# Patient Record
Sex: FEMALE | Race: WHITE | NOT HISPANIC OR LATINO | Employment: FULL TIME | URBAN - NONMETROPOLITAN AREA
[De-identification: names, ages, dates, MRNs, and addresses within clinical notes are randomized per-mention and may not be internally consistent; named-entity substitution may affect disease eponyms.]

---

## 2019-07-24 ENCOUNTER — HOSPITAL ENCOUNTER (OUTPATIENT)
Dept: NUCLEAR MEDICINE | Facility: HOSPITAL | Age: 49
Discharge: HOME | End: 2019-07-24

## 2019-07-24 DIAGNOSIS — R10.11 ABDOMINAL PAIN, RIGHT UPPER QUADRANT: ICD-10-CM

## 2019-07-24 PROCEDURE — 0 TECHNETIUM TC 99M MEBROFENIN KIT: Performed by: FAMILY MEDICINE

## 2019-07-24 PROCEDURE — 78226 HEPATOBILIARY SYSTEM IMAGING: CPT

## 2019-07-24 PROCEDURE — A9537 TC99M MEBROFENIN: HCPCS | Performed by: FAMILY MEDICINE

## 2019-07-24 RX ORDER — KIT FOR THE PREPARATION OF TECHNETIUM TC 99M MEBROFENIN 45 MG/10ML
1 INJECTION, POWDER, LYOPHILIZED, FOR SOLUTION INTRAVENOUS
Status: COMPLETED | OUTPATIENT
Start: 2019-07-24 | End: 2019-07-24

## 2019-07-24 RX ADMIN — MEBROFENIN 1 DOSE: 45 INJECTION, POWDER, LYOPHILIZED, FOR SOLUTION INTRAVENOUS at 07:05

## 2020-06-30 ENCOUNTER — TRANSCRIBE ORDERS (OUTPATIENT)
Dept: PHYSICAL THERAPY | Facility: HOSPITAL | Age: 50
End: 2020-06-30

## 2020-06-30 DIAGNOSIS — M77.12 LATERAL EPICONDYLITIS OF LEFT ELBOW: Primary | ICD-10-CM

## 2020-07-07 ENCOUNTER — HOSPITAL ENCOUNTER (OUTPATIENT)
Dept: PHYSICAL THERAPY | Facility: HOSPITAL | Age: 50
Setting detail: THERAPIES SERIES
Discharge: HOME | End: 2020-07-07

## 2020-07-07 DIAGNOSIS — M77.12 LEFT LATERAL EPICONDYLITIS: Primary | ICD-10-CM

## 2020-07-07 PROCEDURE — 97161 PT EVAL LOW COMPLEX 20 MIN: CPT | Performed by: PHYSICAL THERAPIST

## 2020-07-07 PROCEDURE — 97110 THERAPEUTIC EXERCISES: CPT | Performed by: PHYSICAL THERAPIST

## 2020-07-07 NOTE — THERAPY EVALUATION
Outpatient Physical Therapy Ortho Initial Evaluation  [facility]     Patient Name: Hailee Ball  : 1970  MRN: [id number]  Today's Date: 2020      Visit Date: 2020  Attendance:  (20v/year)  Subjective Improvement: NA  Next MD Appt: TBD  Recert Date: 20    Therapy Diagnosis: left lateral epicondylitis    Allergies       PenicillinsItching   Olayinka as Reviewed Reviewed by You at 3:44 PM CDT         There is no problem list on file for this patient.       History reviewed. No pertinent past medical history.     History reviewed. No pertinent surgical history.    Visit Dx:     ICD-10-CM ICD-9-CM   1. Left lateral epicondylitis M77.12 726.32         Patient History     Row Name 20 1500             History    Chief Complaint  Pain  -SW      Type of Pain  Elbow pain  -SW      Brief Description of Current Complaint  Patient reports insidious onset left lateral elbow pain about 2 months ago. 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 Patient is currently wearing tennis elbow sleeve.   -SW      Patient/Caregiver Goals  Relieve pain;Return to prior level of function  -SW      Hand Dominance  right-handed  -SW         Pain     Pain at Present  5  -SW      Is your sleep disturbed?  Yes  -SW      Is medication used to assist with sleep?  No  -SW      Difficulties at work?  yes  -SW      Difficulties with ADL's?  yes  -SW         Daily Activities    Primary Language  English  -SW      Barriers to learning  None  -SW      Pt Participated in POC and Goals  Yes  -SW         Safety    Are you being hurt, hit, or frightened by anyone at home or in your life?  No  -SW      Are you being neglected by a caregiver  No  -SW        User Key  (r) = Recorded By, (t) = Taken By, (c) = Cosigned By    Initials Name Provider Type    SW Margot Schmitz Physical Therapist          PT Ortho     Row Name 20 1500       Subjective Comments    Subjective  Comments  Patient reports insidious onset left lateral elbow pain about 2 months ago. 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 Patient is currently wearing tennis elbow sleeve.   -SW       Subjective Pain    Able to rate subjective pain?  yes  -SW    Pre-Treatment Pain Level  5  -SW       Special Tests/Palpation    Special Tests/Palpation  -- tenderness to palpation over left lateral epicondyle  -       General ROM    GENERAL ROM COMMENTS  left wrist and elbow WNL's  -SW       MMT (Manual Muscle Testing)    General MMT Comments  left wrist and elbow 5/5, no pain with resistance  -SW      User Key  (r) = Recorded By, (t) = Taken By, (c) = Cosigned By    Initials Name Provider Type    Margot Rodriguez Physical Therapist                      Therapy Education  Education Details: HEP per flowsheet  Given: HEP, Symptoms/condition management  Program: New  How Provided: Verbal, Demonstration, Written  Provided to: Patient  Level of Understanding: Verbalized, Teach back education performed, Demonstrated     PT OP Goals     Row Name 07/07/20 1500          PT Short Term Goals    STG Date to Achieve  07/21/20  -     STG 1  Patient will be independent with HEP.   -        Long Term Goals    LTG Date to Achieve  08/04/20  -     LTG 1  Patient will score 35 on quickdash.   -     LTG 2  Patient will report pain at 1/10 or less consistently.   -     LTG 3  Patient will exhibit no tenderness over left lateral epicondyle.   -SW        Time Calculation    PT Goal Re-Cert Due Date  07/28/20  -       User Key  (r) = Recorded By, (t) = Taken By, (c) = Cosigned By    Initials Name Provider Type    Margot Rodriguez Physical Therapist          PT Assessment/Plan     Row Name 07/07/20 1500          PT Assessment    Functional Limitations  Limitation in home management;Limitations in community activities;Limitations in functional capacity and performance;Performance in  "leisure activities;Performance in self-care ADL;Performance in work activities  -     Impairments  Impaired muscle length;Impaired muscle power;Pain  -     Assessment Comments  50 yof presents with subacute lateral epicondylitis with pain and tenderness at left lateral epicondyle. Treatment will focus on these impairments.   -SW     Rehab Potential  Good  -     Patient/caregiver participated in establishment of treatment plan and goals  Yes  -SW     Patient would benefit from skilled therapy intervention  Yes  -SW        PT Plan    PT Frequency  2x/week  -     Predicted Duration of Therapy Intervention (Therapy Eval)  2-4 weeks  -SW     Planned CPT's?  PT EVAL LOW COMPLEXITY: 12592;PT THER PROC EA 15 MIN: 76721;PT THER ACT EA 15 MIN: 82837;PT MANUAL THERAPY EA 15 MIN: 81433;PT RE-EVAL: 99396;PT NEUROMUSC RE-EDUCATION EA 15 MIN: 75448;PT SELF CARE/HOME MGMT/TRAIN EA 15: 58163;PT HOT OR COLD PACK TREAT MCARE  -     Physical Therapy Interventions (Optional Details)  home exercise program;manual therapy techniques;neuromuscular re-education;patient/family education;strengthening;stretching  -     PT Plan Comments  Focus on left wrist extensor flexibility, eccentric strengthening and soft tissue mobilization.   -       User Key  (r) = Recorded By, (t) = Taken By, (c) = Cosigned By    Initials Name Provider Type    Margot Rodriguez Physical Therapist            OP Exercises     Row Name 07/07/20 1500             Subjective Comments    Subjective Comments  Patient reports insidious onset left lateral elbow pain about 2 months ago. 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 Patient is currently wearing tennis elbow sleeve.   -         Subjective Pain    Able to rate subjective pain?  yes  -SW      Pre-Treatment Pain Level  5  -         Exercise 1    Exercise Name 1  wrist extensor stretch  -      Reps 1  30\"x3  -         Exercise 2    Exercise Name " "2  wrist flexor stretch  -SW      Reps 2  30\"x3  -SW         Exercise 3    Exercise Name 3  eccentric wrist extension  -SW      Reps 3  20  -SW         Exercise 4    Exercise Name 4  taping   -SW        User Key  (r) = Recorded By, (t) = Taken By, (c) = Cosigned By    Initials Name Provider Type    Margot Rodriguez Physical Therapist                        Outcome Measure Options: Quick DASH  Quick DASH  Open a tight or new jar.: Moderate Difficulty  Do heavy household chores (e.g., wash walls, wash floors): Moderate Difficulty  Carry a shopping bag or briefcase: Moderate Difficulty  Wash your back: Moderate Difficulty  Use a knife to cut food: Mild Difficulty  Recreational activities in which you take some force or impact through your arm, should or hand (e.g. golf, hammering, tennis, etc.): Severe Difficulty  During the past week, to what extent has your arm, shoulder, or hand problem interfered with your normal social activites with family, friends, neighbors or groups?: Quite a bit  During the past week, were you limited in your work or other regular daily activities as a result of your arm, shoulder or hand problem?: Moderately Limited  Arm, Shoulder, or hand pain: Moderate  Tingling (pins and needles) in your arm, shoulder, or hand: Severe  During the past week, how much difficulty have you had sleeping because of the pain in your arm, shoulder or hand?: Moderate Difficiculty  Number of Questions Answered: 11  Quick DASH Score: 54.55         Time Calculation:     Start Time: 1539  Stop Time: 1617  Time Calculation (min): 38 min     Therapy Charges for Today     Code Description Service Date Service Provider Modifiers Qty    01999883395 HC PT EVAL LOW COMPLEXITY 1 7/7/2020 Margot Schmitz GP 1    60135382606 HC PT THER PROC EA 15 MIN 7/7/2020 Margot Schmitz GP 2          PT G-Codes  Outcome Measure Options: Quick DASH  Quick DASH Score: 54.55         Margot Schmitz  7/7/2020      "

## 2020-07-09 ENCOUNTER — HOSPITAL ENCOUNTER (OUTPATIENT)
Dept: PHYSICAL THERAPY | Facility: HOSPITAL | Age: 50
Setting detail: THERAPIES SERIES
Discharge: HOME | End: 2020-07-09

## 2020-07-09 DIAGNOSIS — M77.12 LEFT LATERAL EPICONDYLITIS: Primary | ICD-10-CM

## 2020-07-09 PROCEDURE — 97110 THERAPEUTIC EXERCISES: CPT

## 2020-07-09 NOTE — THERAPY TREATMENT NOTE
"    Outpatient Physical Therapy Ortho Treatment Note  [facility]     Patient Name: Hailee Ball  : 1970  MRN: [id number]  Today's Date: 2020      Visit Date: 2020     Subjective Improvement \"maybe a little\"  Visits 2/2  Visits approved 20  RTMD after 4 PT visits  Recert Date 2020    Left Lateral epicondylitis    Visit Dx:    ICD-10-CM ICD-9-CM   1. Left lateral epicondylitis M77.12 726.32       There is no problem list on file for this patient.       No past medical history on file.     No past surgical history on file.    PT Ortho     Row Name 20 1400       Subjective Comments    Subjective Comments  Patient states that her elbow is getting better.  She purchased an elbow brace and that seems to help  -CP       Subjective Pain    Able to rate subjective pain?  yes  -CP    Pre-Treatment Pain Level  4  -CP       Posture/Observations    Posture/Observations Comments  wearing elbow brace and KT tape  -CP        Strength Right    # Reps  3  -CP    Right Rung  2  -CP    Right  Test 1  59  -CP    Right  Test 2  45  -CP    Right  Test 3  50  -CP     Strength Average Right  51.33  -CP        Strength Left    # Reps  3  -CP    Left Rung  2  -CP    Left  Test 1  24  -CP    Left  Test 2  38  -CP    Left  Test 3  34  -CP     Strength Average Left  32  -CP       Hand  Strength     Strength Affected Side  Left;Right  -CP      User Key  (r) = Recorded By, (t) = Taken By, (c) = Cosigned By    Initials Name Provider Type    CP Mary Beth Peters, PTA Physical Therapy Assistant                      PT Assessment/Plan     Row Name 20 1453          PT Assessment    Assessment Comments  pinch strength right 14 lb Left 12 lb.  TTP tp left elbow.  -CP        PT Plan    PT Frequency  2x/week  -CP     Predicted Duration of Therapy Intervention (Therapy Eval)  2-4 weeks  -CP     PT Plan Comments  Cont with POC.  monitor response to HEP.    -CP       User " Key  (r) = Recorded By, (t) = Taken By, (c) = Cosigned By    Initials Name Provider Type    Mary Beth Garber PTA Physical Therapy Assistant            OP Exercises     Row Name 07/09/20 1400             Subjective Comments    Subjective Comments  Patient states that her elbow is getting better.  She purchased an elbow brace and that seems to help  -CP         Subjective Pain    Able to rate subjective pain?  yes  -CP      Pre-Treatment Pain Level  4  -CP      Post-Treatment Pain Level  4  -CP         Exercise 1    Exercise Name 1  wrist ext stretch  -CP      Cueing 1  Verbal;Demo  -CP      Sets 1  3  -CP      Time 1  30  -CP         Exercise 2    Exercise Name 2  wrist fl stretch  -CP      Cueing 2  Verbal;Demo  -CP      Sets 2  3  -CP      Time 2  30  -CP         Exercise 3    Exercise Name 3  AROM wrist fl and ext  -CP      Cueing 3  Verbal;Demo  -CP      Sets 3  3  -CP      Reps 3  10  -CP         Exercise 4    Exercise Name 4  strength testing  and pinch  -CP         Exercise 5    Exercise Name 5  supination/pronation with 1lb weight  -CP      Cueing 5  Verbal;Demo  -CP      Sets 5  2  -CP      Reps 5  10  -CP      Time 5  1 lb DB  -CP         Exercise 6    Exercise Name 6  putty composite fist  -CP      Cueing 6  Verbal  -CP      Reps 6  30  -CP      Time 6  yellow  -CP         Exercise 7    Exercise Name 7  standing scap rows with tband  -CP      Cueing 7  Verbal  -CP      Sets 7  2  -CP      Reps 7  10  -CP      Time 7  green tband  -CP         Exercise 8    Exercise Name 8  MFR/CFM to left elbow  -CP      Time 8  5  -CP        User Key  (r) = Recorded By, (t) = Taken By, (c) = Cosigned By    Initials Name Provider Type    Mary Beth Garber PTA Physical Therapy Assistant                       PT OP Goals     Row Name 07/09/20 1510 07/09/20 1400       PT Short Term Goals    STG Date to Achieve  --  07/21/20  -CP    STG 1  --  Patient will be independent with HEP.   -CP    STG 1 Progress  --   Progressing  -CP       Long Term Goals    LTG Date to Achieve  --  08/04/20  -CP    LTG 1  --  Patient will score 35 on quickdash.   -CP    LTG 1 Progress  --  Not Met  -CP    LTG 2  --  Patient will report pain at 1/10 or less consistently.   -CP    LTG 2 Progress  --  Not Met  -CP    LTG 3  --  Patient will exhibit no tenderness over left lateral epicondyle.   -CP    LTG 3 Progress  --  Not Met  -CP       Time Calculation    PT Goal Re-Cert Due Date  07/28/20  -CP  07/28/20  -CP      User Key  (r) = Recorded By, (t) = Taken By, (c) = Cosigned By    Initials Name Provider Type    CP Mary Beth Peters PTA Physical Therapy Assistant          Therapy Education  Education Details: putty composite fist, scap rows with tband green.  Supination/provation with 1 lb.  ice after work  Given: HEP, Symptoms/condition management  Program: New  How Provided: Verbal, Demonstration, Written  Provided to: Patient  Level of Understanding: Teach back education performed, Verbalized, Demonstrated              Time Calculation:   Start Time: 1430  Stop Time: 1510  Time Calculation (min): 40 min  Total Timed Code Minutes- PT: 40 minute(s)  Therapy Charges for Today     Code Description Service Date Service Provider Modifiers Qty    22062769768 HC PT THER PROC EA 15 MIN 7/9/2020 Mary Beth Peters PTA GP 3                    Mary Beth Peters PTA  7/9/2020

## 2020-07-14 ENCOUNTER — HOSPITAL ENCOUNTER (OUTPATIENT)
Dept: PHYSICAL THERAPY | Facility: HOSPITAL | Age: 50
Setting detail: THERAPIES SERIES
Discharge: HOME | End: 2020-07-14

## 2020-07-14 DIAGNOSIS — M77.12 LEFT LATERAL EPICONDYLITIS: Primary | ICD-10-CM

## 2020-07-14 PROCEDURE — 97110 THERAPEUTIC EXERCISES: CPT

## 2020-07-14 NOTE — THERAPY TREATMENT NOTE
"    Outpatient Physical Therapy Ortho Treatment Note  [facility]     Patient Name: Hailee Ball  : 1970  MRN: [id number]  Today's Date: 2020      Visit Date: 2020     Subjective Improvement \"not sure'  Visits 3/3  Visits approved 20  RTMD patient will call after 4 PT visits  Recert Date 2020    Left lateral epicondylitis    Visit Dx:    ICD-10-CM ICD-9-CM   1. Left lateral epicondylitis M77.12 726.32       There is no problem list on file for this patient.       No past medical history on file.     No past surgical history on file.                    PT Assessment/Plan     Row Name 20 1642          PT Assessment    Assessment Comments  Patient gave good effort with ther ex.  She is TTP to let epicondyle.    -CP        PT Plan    PT Frequency  2x/week  -CP     Predicted Duration of Therapy Intervention (Therapy Eval)  2-4 weeks  -CP     PT Plan Comments  Cont with HEP.  Taping to left elbow next  -CP       User Key  (r) = Recorded By, (t) = Taken By, (c) = Cosigned By    Initials Name Provider Type    Mary Beth Garber PTA Physical Therapy Assistant          Modalities     Row Name 20 1600             Ice    Ice Applied  Yes  -CP      Location  left elbow  -CP      Rx Minutes  10 mins  -CP      Ice S/P Rx  Yes  -CP        User Key  (r) = Recorded By, (t) = Taken By, (c) = Cosigned By    Initials Name Provider Type    Mary Beth Garber PTA Physical Therapy Assistant        OP Exercises     Row Name 20 1600             Subjective Comments    Subjective Comments  Patient states that she just got off work from [employer] where she had to do a lot of lifting.  She reports some increase pain today.  -CP         Subjective Pain    Able to rate subjective pain?  yes  -CP      Pre-Treatment Pain Level  5  -CP      Post-Treatment Pain Level  -- numb from ice  -CP         Exercise 1    Exercise Name 1  Pro II level 2  -CP      Time 1  10  -CP      Additional Comments  UE " "FW/BW  -CP         Exercise 2    Exercise Name 2  left wrist fl and ext stretch  -CP      Cueing 2  Verbal  -CP      Sets 2  3  -CP      Time 2  30 each  -CP         Exercise 3    Exercise Name 3  isometrics wrist ext  -CP      Cueing 3  Verbal;Demo;Tactile  -CP      Sets 3  1  -CP      Reps 3  10  -CP      Time 3  5\" holds  -CP         Exercise 4    Exercise Name 4  supination/pronation   -CP      Cueing 4  Verbal;Demo  -CP      Sets 4  2  -CP      Reps 4  10  -CP      Time 4  hammer with one knob  -CP      Additional Comments  1/2 way  -CP         Exercise 5    Exercise Name 5  review HEP  -CP         Exercise 6    Exercise Name 6  see manual  -CP         Exercise 7    Exercise Name 7  digigrip  -CP      Cueing 7  Verbal;Demo  -CP      Sets 7  3  -CP      Reps 7  10  -CP      Time 7  green  -CP         Exercise 8    Exercise Name 8  press down onto tball  -CP      Cueing 8  Verbal;Demo  -CP      Reps 8  30  -CP        User Key  (r) = Recorded By, (t) = Taken By, (c) = Cosigned By    Initials Name Provider Type    CP Mary Beth Peters PTA Physical Therapy Assistant                      Manual Rx (last 36 hours)      Manual Treatments     Row Name 07/14/20 1500             Manual Rx 1    Manual Rx 1 Location  lrft elbow  -CP      Manual Rx 1 Type  MFR/CRM  -CP      Manual Rx 1 Duration  8  -CP        User Key  (r) = Recorded By, (t) = Taken By, (c) = Cosigned By    Initials Name Provider Type    CP Mary Beth Peters PTA Physical Therapy Assistant          PT OP Goals     Row Name 07/14/20 1600          PT Short Term Goals    STG Date to Achieve  07/21/20  -CP     STG 1  Patient will be independent with HEP.   -CP     STG 1 Progress  Progressing  -CP        Long Term Goals    LTG Date to Achieve  08/04/20  -CP     LTG 1  Patient will score 35 on quickdash.   -CP     LTG 1 Progress  Not Met  -CP     LTG 2  Patient will report pain at 1/10 or less consistently.   -CP     LTG 2 Progress  Not Met  -CP     LTG 3  " Patient will exhibit no tenderness over left lateral epicondyle.   -CP     LTG 3 Progress  Not Met  -CP        Time Calculation    PT Goal Re-Cert Due Date  07/28/20  -CP       User Key  (r) = Recorded By, (t) = Taken By, (c) = Cosigned By    Initials Name Provider Type    CP Mary Beth Peters PTA Physical Therapy Assistant          Therapy Education  Given: HEP  Program: Reinforced  How Provided: Verbal, Demonstration  Provided to: Patient  Level of Understanding: Teach back education performed, Verbalized, Demonstrated              Time Calculation:   Start Time: 1603  Stop Time: 1657  Time Calculation (min): 54 min  Total Timed Code Minutes- PT: 40 minute(s)  Therapy Charges for Today     Code Description Service Date Service Provider Modifiers Qty    54912760229 HC PT THER SUPP EA 15 MIN 7/14/2020 Mary Beth Peters PTA GP 1    26895085363 HC PT THER PROC EA 15 MIN 7/14/2020 Mary Beth Peters PTA GP 3                    Mary Beth Peters PTA  7/14/2020

## 2020-07-16 ENCOUNTER — APPOINTMENT (OUTPATIENT)
Dept: PHYSICAL THERAPY | Facility: HOSPITAL | Age: 50
End: 2020-07-16

## 2020-07-16 ENCOUNTER — HOSPITAL ENCOUNTER (OUTPATIENT)
Dept: PHYSICAL THERAPY | Facility: HOSPITAL | Age: 50
Setting detail: THERAPIES SERIES
Discharge: HOME | End: 2020-07-16

## 2020-07-16 DIAGNOSIS — M77.12 LEFT LATERAL EPICONDYLITIS: Primary | ICD-10-CM

## 2020-07-16 PROCEDURE — 97110 THERAPEUTIC EXERCISES: CPT

## 2020-07-16 NOTE — THERAPY TREATMENT NOTE
Outpatient Physical Therapy Ortho Treatment Note  [facility]     Patient Name: Hailee Ball  : 1970  MRN: [id number]  Today's Date: 2020      Visit Date: 2020     Subjective Improvement 50-60%  Visits 4/5  Visits approved 20  RTMD patient will call  Recert Date 2020    Left lateral epicondylitis    Visit Dx:    ICD-10-CM ICD-9-CM   1. Left lateral epicondylitis M77.12 726.32       There is no problem list on file for this patient.       No past medical history on file.     No past surgical history on file.    PT Ortho     Row Name 20 170       Subjective Comments    Subjective Comments  patient reports decrease pain in left elbow today.  she just got off work and reports lifting is easier  -CP       Subjective Pain    Able to rate subjective pain?  yes  -CP    Pre-Treatment Pain Level  3  -CP       Posture/Observations    Posture/Observations Comments  wearing elbow brace  -CP      User Key  (r) = Recorded By, (t) = Taken By, (c) = Cosigned By    Initials Name Provider Type    Mary Beth Garber PTA Physical Therapy Assistant                      PT Assessment/Plan     Row Name 20 1399          PT Assessment    Assessment Comments  Good tolerance to ther ex.  Patient less TTP to lat epicondyle this date.  Patient was advised to search for another brace which will target her lat epicondylitis  -CP        PT Plan    PT Frequency  2x/week  -CP     Predicted Duration of Therapy Intervention (Therapy Eval)  2-4 weeks  -CP     PT Plan Comments  Cont with POC.  chair press up  -CP       User Key  (r) = Recorded By, (t) = Taken By, (c) = Cosigned By    Initials Name Provider Type    Mary Beth Garber PTA Physical Therapy Assistant            OP Exercises     Row Name 20 9286             Subjective Comments    Subjective Comments  patient reports decrease pain in left elbow today.  she just got off work and reports lifting is easier  -CP         Subjective Pain     Able to rate subjective pain?  yes  -CP      Pre-Treatment Pain Level  3  -CP      Post-Treatment Pain Level  3  -CP         Exercise 1    Exercise Name 1  pro II level 3  -CP      Time 1  10  -CP      Additional Comments  FW/BW  -CP         Exercise 2    Exercise Name 2  lat pull down with tband  -CP      Cueing 2  Verbal;Demo  -CP      Sets 2  2  -CP      Reps 2  10  -CP      Time 2  green  -CP         Exercise 3    Exercise Name 3  doorway stretch  -CP      Cueing 3  Verbal;Demo  -CP      Sets 3  3  -CP      Time 3  30  -CP         Exercise 4    Exercise Name 4  wrist ext and est sretch  -CP      Cueing 4  Verbal  -CP      Sets 4  3  -CP      Time 4  30  -CP      Additional Comments  each  -CP         Exercise 5    Exercise Name 5  seated biceps 3 way  -CP      Cueing 5  Verbal;Demo  -CP      Sets 5  1  -CP      Reps 5  10 each  -CP      Time 5  3 lb db  -CP         Exercise 6    Exercise Name 6  resisted wrist ext with eccentric lower  -CP      Cueing 6  Verbal;Demo  -CP      Sets 6  2  -CP      Reps 6  10  -CP      Time 6  red tband  -CP         Exercise 7    Exercise Name 7  wrist fl with tband  -CP      Cueing 7  Verbal;Demo  -CP      Sets 7  2  -CP      Reps 7  10  -CP      Time 7  red  -CP         Exercise 8    Exercise Name 8  wall plank with left UE and right UE needling  -CP      Cueing 8  Verbal;Demo  -CP      Reps 8  1  -CP      Time 8  10  -CP         Exercise 9    Exercise Name 9  kt taping left elbow  -CP         Exercise 10    Exercise Name 10  review bracing options  -CP        User Key  (r) = Recorded By, (t) = Taken By, (c) = Cosigned By    Initials Name Provider Type    Mary Beth Garber, PTA Physical Therapy Assistant                       PT OP Goals     Row Name 07/16/20 1700          PT Short Term Goals    STG Date to Achieve  07/21/20  -CP     STG 1  Patient will be independent with HEP.   -CP     STG 1 Progress  Progressing  -CP        Long Term Goals    LTG Date to Achieve  08/04/20   -CP     LTG 1  Patient will score 35 on quickdash.   -CP     LTG 1 Progress  Not Met  -CP     LTG 2  Patient will report pain at 1/10 or less consistently.   -CP     LTG 2 Progress  Not Met  -CP     LTG 3  Patient will exhibit no tenderness over left lateral epicondyle.   -CP     LTG 3 Progress  Not Met  -CP        Time Calculation    PT Goal Re-Cert Due Date  07/28/20  -CP       User Key  (r) = Recorded By, (t) = Taken By, (c) = Cosigned By    Initials Name Provider Type    CP Mary Beth Peters, BRUNO Physical Therapy Assistant          Therapy Education  Education Details: doorway stretch, lat pull downs with tband, wrist fl and ext with tband  Given: HEP  Program: New  How Provided: Verbal, Demonstration, Written  Provided to: Patient  Level of Understanding: Teach back education performed, Verbalized, Demonstrated              Time Calculation:   Start Time: 1445  Stop Time: 1545  Time Calculation (min): 60 min  Total Timed Code Minutes- PT: 55 minute(s)  Therapy Charges for Today     Code Description Service Date Service Provider Modifiers Qty    25637587793 HC PT THER PROC EA 15 MIN 7/16/2020 Mary Beth Peters PTA GP 4                    Mary Beth Peters PTA  7/16/2020

## 2020-07-20 ENCOUNTER — APPOINTMENT (OUTPATIENT)
Dept: PHYSICAL THERAPY | Facility: HOSPITAL | Age: 50
End: 2020-07-20

## 2020-07-22 ENCOUNTER — APPOINTMENT (OUTPATIENT)
Dept: PHYSICAL THERAPY | Facility: HOSPITAL | Age: 50
End: 2020-07-22

## 2020-07-28 ENCOUNTER — APPOINTMENT (OUTPATIENT)
Dept: PHYSICAL THERAPY | Facility: HOSPITAL | Age: 50
End: 2020-07-28

## 2020-07-30 ENCOUNTER — APPOINTMENT (OUTPATIENT)
Dept: PHYSICAL THERAPY | Facility: HOSPITAL | Age: 50
End: 2020-07-30

## 2020-08-04 ENCOUNTER — APPOINTMENT (OUTPATIENT)
Dept: PHYSICAL THERAPY | Facility: HOSPITAL | Age: 50
End: 2020-08-04

## 2020-08-06 ENCOUNTER — APPOINTMENT (OUTPATIENT)
Dept: PHYSICAL THERAPY | Facility: HOSPITAL | Age: 50
End: 2020-08-06